# Patient Record
Sex: FEMALE | Race: BLACK OR AFRICAN AMERICAN | NOT HISPANIC OR LATINO | URBAN - NONMETROPOLITAN AREA
[De-identification: names, ages, dates, MRNs, and addresses within clinical notes are randomized per-mention and may not be internally consistent; named-entity substitution may affect disease eponyms.]

---

## 2018-09-27 NOTE — PATIENT DISCUSSION
Patient is moving to [facility] [place] after 11/2018 permanently.

## 2020-07-03 ENCOUNTER — IMPORTED ENCOUNTER (OUTPATIENT)
Dept: URBAN - NONMETROPOLITAN AREA CLINIC 1 | Facility: CLINIC | Age: 45
End: 2020-07-03

## 2020-07-03 PROBLEM — E11.9: Noted: 2020-07-03

## 2020-07-03 PROBLEM — H52.4: Noted: 2020-07-03

## 2020-07-03 PROBLEM — H40.013: Noted: 2020-07-03

## 2020-07-03 PROCEDURE — 92015 DETERMINE REFRACTIVE STATE: CPT

## 2020-07-03 PROCEDURE — 92004 COMPRE OPH EXAM NEW PT 1/>: CPT

## 2020-07-03 NOTE — PATIENT DISCUSSION
Presbyopia OUDiscussed refractive status in detail with patient. New glasses Rx given today. Continue to monitor. Glaucoma Suspect OUDiscussed diagnosis in detail with patient. IOP at 12 OU. Cup to disc noted at 0.8 OU. Continue to monitor. RTC in 6 months with VF 24-2 OCT and PachIDDM sans Retinopathy Discussed diagnosis with patient. Discussed the risk of diabetic damage of the retina with potential vision loss and the importance of routine follow-up. Emphasized strict blood sugar control. Continue to monitor.; 's Notes: MR  7/3/20DFE  7/3/20OCTVFOPTOSGONIQUINTINCH

## 2021-08-02 ENCOUNTER — PREPPED CHART (OUTPATIENT)
Dept: URBAN - NONMETROPOLITAN AREA CLINIC 1 | Facility: CLINIC | Age: 46
End: 2021-08-02

## 2021-08-02 ENCOUNTER — IMPORTED ENCOUNTER (OUTPATIENT)
Dept: URBAN - NONMETROPOLITAN AREA CLINIC 1 | Facility: CLINIC | Age: 46
End: 2021-08-02

## 2021-08-02 PROCEDURE — 92015 DETERMINE REFRACTIVE STATE: CPT

## 2021-08-02 PROCEDURE — 92014 COMPRE OPH EXAM EST PT 1/>: CPT

## 2021-08-02 NOTE — PATIENT DISCUSSION
Discussed diagnosis in detail with patient. Family history of glaucoma with mother. Appears stable on exam today. Continue current treatment regimen. Continue to monitor every few months with testing as directed.

## 2021-08-02 NOTE — PATIENT DISCUSSION
Presbyopia OUDiscussed refractive status in detail with patient. New glasses Rx given today. Continue to monitor. Glaucoma Suspect OUDiscussed diagnosis in detail with patient. Positive family history (mother)IOP at 15 OU. Cup to disc noted at 0.8 OU. Continue to monitor. RTC in 6 months with VF 24-2 OCT and PachIDDM sans Retinopathy Discussed diagnosis with patient. Discussed the risk of diabetic damage of the retina with potential vision loss and the importance of routine follow-up. Emphasized strict blood sugar control. Continue to monitor.; 's Notes: MR   8/2/21DFE   8/2/21OCTVFOPTOSSD

## 2021-08-02 NOTE — PATIENT DISCUSSION
Discussed diagnosis in detail with patient. Stressed importance of good blood sugar control and how it can affect ocular health/vision. Recommend no soda’s.  Letter will be sent to PCP today as well. Continue to monitor closely for changes.

## 2022-02-08 ASSESSMENT — VISUAL ACUITY
OS_CC: 20/20
OD_CC: 20/20-1

## 2022-02-08 ASSESSMENT — TONOMETRY
OD_IOP_MMHG: 14
OS_IOP_MMHG: 14

## 2022-02-10 ENCOUNTER — FOLLOW UP (OUTPATIENT)
Dept: URBAN - NONMETROPOLITAN AREA CLINIC 1 | Facility: CLINIC | Age: 47
End: 2022-02-10

## 2022-02-10 DIAGNOSIS — H40.013: ICD-10-CM

## 2022-02-10 PROCEDURE — 92083 EXTENDED VISUAL FIELD XM: CPT

## 2022-02-10 PROCEDURE — 99214 OFFICE O/P EST MOD 30 MIN: CPT

## 2022-02-10 PROCEDURE — 92133 CPTRZD OPH DX IMG PST SGM ON: CPT

## 2022-02-10 PROCEDURE — 76514 ECHO EXAM OF EYE THICKNESS: CPT

## 2022-02-10 ASSESSMENT — TONOMETRY
OD_IOP_MMHG: 18
OS_IOP_MMHG: 18

## 2022-02-10 ASSESSMENT — PACHYMETRY
OD_CT_UM: 599
OS_CT_UM: 626

## 2022-02-10 ASSESSMENT — VISUAL ACUITY
OS_CC: 20/25+2
OD_CC: 20/20

## 2022-04-15 ASSESSMENT — VISUAL ACUITY
OD_SC: 20/20-1
OS_SC: 20/25-1
OD_SC: 20/20
OS_SC: 20/20

## 2022-04-15 ASSESSMENT — TONOMETRY
OS_IOP_MMHG: 12
OD_IOP_MMHG: 12
OS_IOP_MMHG: 14
OD_IOP_MMHG: 14

## 2022-08-11 ENCOUNTER — COMPREHENSIVE EXAM (OUTPATIENT)
Dept: URBAN - NONMETROPOLITAN AREA CLINIC 1 | Facility: CLINIC | Age: 47
End: 2022-08-11

## 2022-08-11 DIAGNOSIS — H52.4: ICD-10-CM

## 2022-08-11 PROCEDURE — S0621 ROUTINE OPHTHALMOLOGICAL EXA: HCPCS

## 2022-08-11 ASSESSMENT — TONOMETRY
OD_IOP_MMHG: 17
OS_IOP_MMHG: 17

## 2022-08-11 ASSESSMENT — VISUAL ACUITY
OS_CC: 20/25-1
OD_CC: 20/25-2

## 2023-08-25 ENCOUNTER — ESTABLISHED PATIENT (OUTPATIENT)
Dept: URBAN - NONMETROPOLITAN AREA CLINIC 1 | Facility: CLINIC | Age: 48
End: 2023-08-25

## 2023-08-25 DIAGNOSIS — H52.4: ICD-10-CM

## 2023-08-25 PROCEDURE — S0621 ROUTINE OPHTHALMOLOGICAL EXA: HCPCS

## 2023-08-25 ASSESSMENT — TONOMETRY
OS_IOP_MMHG: 18
OD_IOP_MMHG: 18

## 2023-08-25 ASSESSMENT — VISUAL ACUITY
OU_CC: 20/20
OD_CC: 20/30
OS_CC: 20/25

## 2024-03-01 ENCOUNTER — FOLLOW UP (OUTPATIENT)
Dept: URBAN - NONMETROPOLITAN AREA CLINIC 1 | Facility: CLINIC | Age: 49
End: 2024-03-01

## 2024-03-01 DIAGNOSIS — E11.3293: ICD-10-CM

## 2024-03-01 DIAGNOSIS — H40.023: ICD-10-CM

## 2024-03-01 DIAGNOSIS — Z79.4: ICD-10-CM

## 2024-03-01 PROCEDURE — 92133 CPTRZD OPH DX IMG PST SGM ON: CPT

## 2024-03-01 PROCEDURE — 99214 OFFICE O/P EST MOD 30 MIN: CPT

## 2024-03-01 PROCEDURE — 92083 EXTENDED VISUAL FIELD XM: CPT

## 2024-03-01 ASSESSMENT — VISUAL ACUITY
OS_CC: 20/22-
OD_CC: 20/22-
OU_CC: 20/22+

## 2024-03-01 ASSESSMENT — TONOMETRY
OD_IOP_MMHG: 18
OS_IOP_MMHG: 18

## 2024-09-10 ENCOUNTER — COMPREHENSIVE EXAM (OUTPATIENT)
Dept: URBAN - NONMETROPOLITAN AREA CLINIC 1 | Facility: CLINIC | Age: 49
End: 2024-09-10

## 2024-09-10 DIAGNOSIS — H52.4: ICD-10-CM

## 2024-09-10 PROCEDURE — S0621AEC ROUTINE OPH EXAM INCLUDES REF/ EST PATIENT

## 2025-03-13 ENCOUNTER — FOLLOW UP (OUTPATIENT)
Age: 50
End: 2025-03-13

## 2025-03-13 DIAGNOSIS — Z79.4: ICD-10-CM

## 2025-03-13 DIAGNOSIS — E11.3292: ICD-10-CM

## 2025-03-13 DIAGNOSIS — H40.023: ICD-10-CM

## 2025-03-13 PROCEDURE — 92134 CPTRZ OPH DX IMG PST SGM RTA: CPT | Mod: NC

## 2025-03-13 PROCEDURE — 92133 CPTRZD OPH DX IMG PST SGM ON: CPT

## 2025-03-13 PROCEDURE — 92083 EXTENDED VISUAL FIELD XM: CPT

## 2025-03-13 PROCEDURE — 99213 OFFICE O/P EST LOW 20 MIN: CPT
